# Patient Record
Sex: FEMALE | Race: ASIAN | NOT HISPANIC OR LATINO | Employment: STUDENT | URBAN - METROPOLITAN AREA
[De-identification: names, ages, dates, MRNs, and addresses within clinical notes are randomized per-mention and may not be internally consistent; named-entity substitution may affect disease eponyms.]

---

## 2020-03-02 ENCOUNTER — HOSPITAL ENCOUNTER (EMERGENCY)
Facility: MEDICAL CENTER | Age: 30
End: 2020-03-03
Attending: EMERGENCY MEDICINE
Payer: COMMERCIAL

## 2020-03-02 ENCOUNTER — APPOINTMENT (OUTPATIENT)
Dept: RADIOLOGY | Facility: MEDICAL CENTER | Age: 30
End: 2020-03-02
Attending: EMERGENCY MEDICINE
Payer: COMMERCIAL

## 2020-03-02 DIAGNOSIS — O20.0 THREATENED MISCARRIAGE: ICD-10-CM

## 2020-03-02 LAB
BASOPHILS # BLD AUTO: 0.5 % (ref 0–1.8)
BASOPHILS # BLD: 0.05 K/UL (ref 0–0.12)
EOSINOPHIL # BLD AUTO: 0.21 K/UL (ref 0–0.51)
EOSINOPHIL NFR BLD: 2.1 % (ref 0–6.9)
ERYTHROCYTE [DISTWIDTH] IN BLOOD BY AUTOMATED COUNT: 37.2 FL (ref 35.9–50)
HCT VFR BLD AUTO: 42.3 % (ref 37–47)
HGB BLD-MCNC: 14 G/DL (ref 12–16)
IMM GRANULOCYTES # BLD AUTO: 0.02 K/UL (ref 0–0.11)
IMM GRANULOCYTES NFR BLD AUTO: 0.2 % (ref 0–0.9)
LYMPHOCYTES # BLD AUTO: 3.35 K/UL (ref 1–4.8)
LYMPHOCYTES NFR BLD: 33.8 % (ref 22–41)
MCH RBC QN AUTO: 25.8 PG (ref 27–33)
MCHC RBC AUTO-ENTMCNC: 33.1 G/DL (ref 33.6–35)
MCV RBC AUTO: 78 FL (ref 81.4–97.8)
MONOCYTES # BLD AUTO: 0.48 K/UL (ref 0–0.85)
MONOCYTES NFR BLD AUTO: 4.8 % (ref 0–13.4)
NEUTROPHILS # BLD AUTO: 5.79 K/UL (ref 2–7.15)
NEUTROPHILS NFR BLD: 58.6 % (ref 44–72)
NRBC # BLD AUTO: 0 K/UL
NRBC BLD-RTO: 0 /100 WBC
NUMBER OF RH DOSES IND 8505RD: NORMAL
PLATELET # BLD AUTO: 211 K/UL (ref 164–446)
PMV BLD AUTO: 10.9 FL (ref 9–12.9)
RBC # BLD AUTO: 5.42 M/UL (ref 4.2–5.4)
RH BLD: NORMAL
WBC # BLD AUTO: 9.9 K/UL (ref 4.8–10.8)

## 2020-03-02 PROCEDURE — 85025 COMPLETE CBC W/AUTO DIFF WBC: CPT

## 2020-03-02 PROCEDURE — 86901 BLOOD TYPING SEROLOGIC RH(D): CPT

## 2020-03-02 PROCEDURE — 81002 URINALYSIS NONAUTO W/O SCOPE: CPT

## 2020-03-02 PROCEDURE — 84702 CHORIONIC GONADOTROPIN TEST: CPT

## 2020-03-02 PROCEDURE — 76801 OB US < 14 WKS SINGLE FETUS: CPT

## 2020-03-02 PROCEDURE — 99284 EMERGENCY DEPT VISIT MOD MDM: CPT

## 2020-03-03 VITALS
HEART RATE: 96 BPM | BODY MASS INDEX: 20.32 KG/M2 | WEIGHT: 119.05 LBS | OXYGEN SATURATION: 95 % | TEMPERATURE: 97 F | HEIGHT: 64 IN | SYSTOLIC BLOOD PRESSURE: 105 MMHG | RESPIRATION RATE: 18 BRPM | DIASTOLIC BLOOD PRESSURE: 78 MMHG

## 2020-03-03 LAB — B-HCG SERPL-ACNC: 5133.5 MIU/ML (ref 0–5)

## 2020-03-03 NOTE — ED PROVIDER NOTES
ED Provider Note    CHIEF COMPLAINT  Chief Complaint   Patient presents with   • Abdominal Pain     Positive home pregnancy test, , approximately 8 weeks gestation/ no OB-GYN appointment yet   • Vaginal Bleeding   • Fatigue       HPI  Марина Zhang is a 29 y.o. female who presents with abdominal pain.  Patient states she is approximately 8 weeks pregnant and she developed some vaginal bleeding today with suprapubic abdominal pain.  She states the pain is moderate in intensity with no known exacerbating or relieving factors.  She has not had any vaginal discharge.  She denies difficulties with urination.  She has not had any associated fevers.    REVIEW OF SYSTEMS  See HPI for further details. All other systems are negative.     PAST MEDICAL HISTORY  No past medical history on file.    FAMILY HISTORY  @[email or web address]@    SOCIAL HISTORY  Social History     Socioeconomic History   • Marital status: Not on file     Spouse name: Not on file   • Number of children: Not on file   • Years of education: Not on file   • Highest education level: Not on file   Occupational History   • Not on file   Social Needs   • Financial resource strain: Not on file   • Food insecurity     Worry: Not on file     Inability: Not on file   • Transportation needs     Medical: Not on file     Non-medical: Not on file   Tobacco Use   • Smoking status: Not on file   Substance and Sexual Activity   • Alcohol use: Not on file   • Drug use: Not on file   • Sexual activity: Not on file   Lifestyle   • Physical activity     Days per week: Not on file     Minutes per session: Not on file   • Stress: Not on file   Relationships   • Social connections     Talks on phone: Not on file     Gets together: Not on file     Attends Confucianism service: Not on file     Active member of club or organization: Not on file     Attends meetings of clubs or organizations: Not on file     Relationship status: Not on file   • Intimate partner violence     Fear of current or ex  "partner: Not on file     Emotionally abused: Not on file     Physically abused: Not on file     Forced sexual activity: Not on file   Other Topics Concern   • Not on file   Social History Narrative   • Not on file       SURGICAL HISTORY  No past surgical history on file.    CURRENT MEDICATIONS  Home Medications     Reviewed by Mateo Solis R.N. (Registered Nurse) on 03/02/20 at 2142  Med List Status: Partial   Medication Last Dose Status        Patient William Taking any Medications                       ALLERGIES  No Known Allergies    PHYSICAL EXAM  VITAL SIGNS: /78   Pulse 86   Temp 36.1 °C (97 °F)   Resp 18   Ht 1.626 m (5' 4\")   Wt 54 kg (119 lb 0.8 oz)   SpO2 99%   BMI 20.43 kg/m²       Constitutional: Mild acute distress, Non-toxic appearance.   HENT: Normocephalic, Atraumatic, Bilateral external ears normal, Oropharynx moist, No oral exudates, Nose normal.   Eyes: PERRLA, EOMI, Conjunctiva normal, No discharge.   Neck: Normal range of motion, No tenderness, Supple, No stridor.   Lymphatic: No lymphadenopathy noted.   Cardiovascular: Normal heart rate, Normal rhythm, No murmurs, No rubs, No gallops.   Thorax & Lungs: Normal breath sounds, No respiratory distress, No wheezing, No chest tenderness.   Abdomen: Suprapubic tenderness to deep palpation, no rebound, no guarding, normal bowel sounds  Skin: Warm, Dry, No erythema, No rash.   Back: No tenderness, No CVA tenderness.   Genitalia: External genitalia appear normal, cervix is closed, mild active bleeding  Extremities: Intact distal pulses, No edema, No tenderness, No cyanosis, No clubbing.   Neurologic: Alert & oriented x 3, Normal motor function, Normal sensory function, No focal deficits noted.   Psychiatric: Affect normal, Judgment normal, Mood normal.     RADIOLOGY/PROCEDURES  US-OB 1ST TRIMESTER WITH TRANSVAGINAL (COMBO)   Final Result      1.  Probable early intrauterine gestational sac but with no evidence of fetal pole or yolk sac. "   2.  Simple appearing cyst in the right ovary.            COURSE & MEDICAL DECISION MAKING  Pertinent Labs & Imaging studies reviewed. (See chart for details)  This is a 29-year-old female who presents the emergency department with abdominal pain and vaginal bleeding.  The patient's quantitative beta-hCG is greater than 5000 and the ultrasound only shows a gestational sac with no fetal pole.  This is concerning for inevitable miscarriage.  Otherwise there is no evidence of an ectopic pregnancy and this cannot be completely excluded.  The patient is hemodynamically stable and she is not anemic.  The patient be discharged home with clear instructions to follow-up in 48 hours either with her gynecologist this she has not yet established care with or here in the emergency department for repeat exam.  The patient's urinalysis is contaminated and she does not have any urinary symptoms to support a urinary tract infection.    FINAL IMPRESSION  1.  Threatened miscarriage    Disposition  The patient will be discharged in stable condition         Electronically signed by: Cameron Guo M.D., 3/2/2020 10:45 PM

## 2020-03-03 NOTE — ED NOTES
Pt introduced to staff, plan of care made clear to pt, no further concerns at present time. Pt resting comfortably on stretcher, pt bed height at lowest position. Pt call bell in reach. Pt has no unmet needs at present time. Pt changed into hospital gown and awaiting to be seen by ERP.

## 2020-03-03 NOTE — ED NOTES
Pt given d/c instructions. Pt able to illustrate understanding as evidence by verbal feed-back of information given. Pt is stable for d/c at present time. Pt has no further concerns at present time. Pt able to ambulate.

## 2020-03-03 NOTE — ED TRIAGE NOTES
Chief Complaint   Patient presents with   • Abdominal Pain     Positive home pregnancy test, , approximately 8 weeks gestation/ no OB-GYN appointment yet   • Vaginal Bleeding   • Fatigue

## 2020-03-04 ENCOUNTER — HOSPITAL ENCOUNTER (EMERGENCY)
Facility: MEDICAL CENTER | Age: 30
End: 2020-03-04
Attending: EMERGENCY MEDICINE
Payer: COMMERCIAL

## 2020-03-04 VITALS
WEIGHT: 125 LBS | HEIGHT: 64 IN | OXYGEN SATURATION: 97 % | SYSTOLIC BLOOD PRESSURE: 90 MMHG | HEART RATE: 60 BPM | BODY MASS INDEX: 21.34 KG/M2 | DIASTOLIC BLOOD PRESSURE: 58 MMHG | RESPIRATION RATE: 15 BRPM | TEMPERATURE: 98 F

## 2020-03-04 DIAGNOSIS — N93.9 VAGINAL BLEEDING: ICD-10-CM

## 2020-03-04 DIAGNOSIS — R10.30 LOWER ABDOMINAL PAIN: ICD-10-CM

## 2020-03-04 DIAGNOSIS — O03.9 MISCARRIAGE: ICD-10-CM

## 2020-03-04 LAB
ALBUMIN SERPL BCP-MCNC: 3.8 G/DL (ref 3.2–4.9)
ALBUMIN/GLOB SERPL: 1.1 G/DL
ALP SERPL-CCNC: 72 U/L (ref 30–99)
ALT SERPL-CCNC: 8 U/L (ref 2–50)
ANION GAP SERPL CALC-SCNC: 9 MMOL/L (ref 0–11.9)
AST SERPL-CCNC: 11 U/L (ref 12–45)
BASOPHILS # BLD AUTO: 0.5 % (ref 0–1.8)
BASOPHILS # BLD: 0.06 K/UL (ref 0–0.12)
BILIRUB SERPL-MCNC: 0.5 MG/DL (ref 0.1–1.5)
BUN SERPL-MCNC: 11 MG/DL (ref 8–22)
CALCIUM SERPL-MCNC: 8.8 MG/DL (ref 8.5–10.5)
CHLORIDE SERPL-SCNC: 104 MMOL/L (ref 96–112)
CO2 SERPL-SCNC: 24 MMOL/L (ref 20–33)
CREAT SERPL-MCNC: 0.77 MG/DL (ref 0.5–1.4)
EOSINOPHIL # BLD AUTO: 0.21 K/UL (ref 0–0.51)
EOSINOPHIL NFR BLD: 1.7 % (ref 0–6.9)
ERYTHROCYTE [DISTWIDTH] IN BLOOD BY AUTOMATED COUNT: 37.2 FL (ref 35.9–50)
GLOBULIN SER CALC-MCNC: 3.4 G/DL (ref 1.9–3.5)
GLUCOSE SERPL-MCNC: 118 MG/DL (ref 65–99)
HCT VFR BLD AUTO: 43.8 % (ref 37–47)
HGB BLD-MCNC: 14.2 G/DL (ref 12–16)
IMM GRANULOCYTES # BLD AUTO: 0.05 K/UL (ref 0–0.11)
IMM GRANULOCYTES NFR BLD AUTO: 0.4 % (ref 0–0.9)
LYMPHOCYTES # BLD AUTO: 3.92 K/UL (ref 1–4.8)
LYMPHOCYTES NFR BLD: 31.6 % (ref 22–41)
MCH RBC QN AUTO: 25.4 PG (ref 27–33)
MCHC RBC AUTO-ENTMCNC: 32.4 G/DL (ref 33.6–35)
MCV RBC AUTO: 78.5 FL (ref 81.4–97.8)
MONOCYTES # BLD AUTO: 0.71 K/UL (ref 0–0.85)
MONOCYTES NFR BLD AUTO: 5.7 % (ref 0–13.4)
NEUTROPHILS # BLD AUTO: 7.47 K/UL (ref 2–7.15)
NEUTROPHILS NFR BLD: 60.1 % (ref 44–72)
NRBC # BLD AUTO: 0 K/UL
NRBC BLD-RTO: 0 /100 WBC
PATHOLOGY CONSULT NOTE: NORMAL
PLATELET # BLD AUTO: 208 K/UL (ref 164–446)
PMV BLD AUTO: 10.5 FL (ref 9–12.9)
POTASSIUM SERPL-SCNC: 3.7 MMOL/L (ref 3.6–5.5)
PROT SERPL-MCNC: 7.2 G/DL (ref 6–8.2)
RBC # BLD AUTO: 5.58 M/UL (ref 4.2–5.4)
SODIUM SERPL-SCNC: 137 MMOL/L (ref 135–145)
WBC # BLD AUTO: 12.4 K/UL (ref 4.8–10.8)

## 2020-03-04 PROCEDURE — 99285 EMERGENCY DEPT VISIT HI MDM: CPT

## 2020-03-04 PROCEDURE — 96375 TX/PRO/DX INJ NEW DRUG ADDON: CPT

## 2020-03-04 PROCEDURE — 85025 COMPLETE CBC W/AUTO DIFF WBC: CPT

## 2020-03-04 PROCEDURE — 88305 TISSUE EXAM BY PATHOLOGIST: CPT

## 2020-03-04 PROCEDURE — 80053 COMPREHEN METABOLIC PANEL: CPT

## 2020-03-04 PROCEDURE — 96374 THER/PROPH/DIAG INJ IV PUSH: CPT

## 2020-03-04 PROCEDURE — 700111 HCHG RX REV CODE 636 W/ 250 OVERRIDE (IP): Performed by: EMERGENCY MEDICINE

## 2020-03-04 RX ORDER — ONDANSETRON 4 MG/1
4 TABLET, ORALLY DISINTEGRATING ORAL EVERY 6 HOURS PRN
Qty: 5 TAB | Refills: 0 | Status: ON HOLD | OUTPATIENT
Start: 2020-03-04 | End: 2021-01-21

## 2020-03-04 RX ORDER — ONDANSETRON 2 MG/ML
4 INJECTION INTRAMUSCULAR; INTRAVENOUS ONCE
Status: COMPLETED | OUTPATIENT
Start: 2020-03-04 | End: 2020-03-04

## 2020-03-04 RX ORDER — HYDROCODONE BITARTRATE AND ACETAMINOPHEN 5; 325 MG/1; MG/1
1 TABLET ORAL EVERY 4 HOURS PRN
Qty: 10 TAB | Refills: 0 | Status: SHIPPED | OUTPATIENT
Start: 2020-03-04 | End: 2020-03-07

## 2020-03-04 RX ORDER — MORPHINE SULFATE 4 MG/ML
4 INJECTION, SOLUTION INTRAMUSCULAR; INTRAVENOUS ONCE
Status: COMPLETED | OUTPATIENT
Start: 2020-03-04 | End: 2020-03-04

## 2020-03-04 RX ADMIN — ONDANSETRON 4 MG: 2 INJECTION INTRAMUSCULAR; INTRAVENOUS at 04:46

## 2020-03-04 RX ADMIN — MORPHINE SULFATE 4 MG: 4 INJECTION INTRAVENOUS at 04:46

## 2020-03-04 SDOH — HEALTH STABILITY: MENTAL HEALTH: HOW OFTEN DO YOU HAVE A DRINK CONTAINING ALCOHOL?: NEVER

## 2020-03-04 NOTE — ED PROVIDER NOTES
"ED Provider Note    CHIEF COMPLAINT  Chief Complaint   Patient presents with   • Miscarriage     Diagnosed with miscarriage yesterday. Pt reports vaginal bleeding and cramping. Approx 8 weeks pregnant.         HPI    Primary care provider: No primary care provider on file.   History obtained from: Patient and   History limited by: None     Марина Zhang is a 29 y.o. female who presents to the ED with  complaining of worsening lower abdominal pain tonight waking her from sleep shortly prior to arrival.  Patient states that she was seen in this ED yesterday and diagnosed with a miscarriage.  She has had continued vaginal spotting and bleeding but the pain became worse tonight which prompted her to come to the ED.  Patient is unsure of her blood type but record review shows that she is Rh+.  She reports lower back pain as well.  She otherwise denies pain anywhere else.  She denies any known fever.  She denies shortness of breath/difficulty breathing.  She has had some nausea and vomited once.  She reports normal bowel movement and urination.  She is not on any blood thinners.    REVIEW OF SYSTEMS  Please see HPI for pertinent positives/negatives.  All other systems reviewed and are negative.     PAST MEDICAL HISTORY  No past medical history on file.     SURGICAL HISTORY  History reviewed. No pertinent surgical history.     SOCIAL HISTORY  Social History     Tobacco Use   • Smoking status: Never Smoker   • Smokeless tobacco: Never Used   Substance and Sexual Activity   • Alcohol use: Never     Frequency: Never   • Drug use: Never   • Sexual activity: Not on file        FAMILY HISTORY  History reviewed. No pertinent family history.     CURRENT MEDICATIONS  Home Medications    **Home medications have not yet been reviewed for this encounter**          ALLERGIES  No Known Allergies     PHYSICAL EXAM  VITAL SIGNS: BP (!) 90/58   Pulse 60   Temp 36.7 °C (98 °F) (Temporal)   Resp 15   Ht 1.626 m (5' 4\")   Wt " 56.7 kg (125 lb)   SpO2 97%   BMI 21.46 kg/m²  @RUPERT[[id number]::@     Pulse ox interpretation: 100% I interpret this pulse ox as normal     Constitutional: Well developed, well nourished, alert in mild discomfort, nontoxic appearance    HENT: No external signs of trauma, normocephalic, oropharynx moist and clear, nose normal    Eyes: PERRL, conjunctiva without erythema, no discharge, no icterus    Neck: Soft and supple, trachea midline, no stridor, no tenderness, no LAD, no JVD, good ROM    Cardiovascular: Regular rate and rhythm, no murmurs/rubs/gallops, strong distal pulses and good perfusion    Thorax & Lungs: No respiratory distress, CTAB   Abdomen: Soft, mild tenderness across lower abdomen, nondistended, no guarding, no rebound, normal BS   : Female chaperon present for exam.  NEFG, vaginal canal with blood in appearing tissue which was removed using ring forceps and sent to pathology, cervix closed, no CMT, no uterine TTP, no AT or palpable mass     Back: No CVAT    Extremities: No cyanosis, no edema, no gross deformity, good ROM, intact distal pulses with brisk cap refill    Skin: Warm, dry, no pallor/cyanosis, no rash noted    Lymphatic: No lymphadenopathy noted    Neuro: A/O times 3, no focal deficits noted    Psychiatric: Cooperative, slightly anxious      DIAGNOSTIC STUDIES / PROCEDURES        LABS  All labs reviewed by me.     Results for orders placed or performed during the hospital encounter of 03/04/20   CBC WITH DIFFERENTIAL   Result Value Ref Range    WBC 12.4 (H) 4.8 - 10.8 K/uL    RBC 5.58 (H) 4.20 - 5.40 M/uL    Hemoglobin 14.2 12.0 - 16.0 g/dL    Hematocrit 43.8 37.0 - 47.0 %    MCV 78.5 (L) 81.4 - 97.8 fL    MCH 25.4 (L) 27.0 - 33.0 pg    MCHC 32.4 (L) 33.6 - 35.0 g/dL    RDW 37.2 35.9 - 50.0 fL    Platelet Count 208 164 - 446 K/uL    MPV 10.5 9.0 - 12.9 fL    Neutrophils-Polys 60.10 44.00 - 72.00 %    Lymphocytes 31.60 22.00 - 41.00 %    Monocytes 5.70 0.00 - 13.40 %    Eosinophils 1.70 0.00  - 6.90 %    Basophils 0.50 0.00 - 1.80 %    Immature Granulocytes 0.40 0.00 - 0.90 %    Nucleated RBC 0.00 /100 WBC    Neutrophils (Absolute) 7.47 (H) 2.00 - 7.15 K/uL    Lymphs (Absolute) 3.92 1.00 - 4.80 K/uL    Monos (Absolute) 0.71 0.00 - 0.85 K/uL    Eos (Absolute) 0.21 0.00 - 0.51 K/uL    Baso (Absolute) 0.06 0.00 - 0.12 K/uL    Immature Granulocytes (abs) 0.05 0.00 - 0.11 K/uL    NRBC (Absolute) 0.00 K/uL   COMP METABOLIC PANEL   Result Value Ref Range    Sodium 137 135 - 145 mmol/L    Potassium 3.7 3.6 - 5.5 mmol/L    Chloride 104 96 - 112 mmol/L    Co2 24 20 - 33 mmol/L    Anion Gap 9.0 0.0 - 11.9    Glucose 118 (H) 65 - 99 mg/dL    Bun 11 8 - 22 mg/dL    Creatinine 0.77 0.50 - 1.40 mg/dL    Calcium 8.8 8.5 - 10.5 mg/dL    AST(SGOT) 11 (L) 12 - 45 U/L    ALT(SGPT) 8 2 - 50 U/L    Alkaline Phosphatase 72 30 - 99 U/L    Total Bilirubin 0.5 0.1 - 1.5 mg/dL    Albumin 3.8 3.2 - 4.9 g/dL    Total Protein 7.2 6.0 - 8.2 g/dL    Globulin 3.4 1.9 - 3.5 g/dL    A-G Ratio 1.1 g/dL   ESTIMATED GFR   Result Value Ref Range    GFR If African American >60 >60 mL/min/1.73 m 2    GFR If Non African American >60 >60 mL/min/1.73 m 2        RADIOLOGY  The radiologist's interpretation of all radiological studies have been reviewed by me.     No orders to display          COURSE & MEDICAL DECISION MAKING  Nursing notes, VS, PMSFHx reviewed in chart.     Review of past medical records shows the patient was last seen in this ED March 2, 2020 for abdominal pain, vaginal bleeding and fatigue.  Her quant hCG was 5133 and she was Rh+.  Ultrasound with the following findings:    An approximate 9 mm cystic structure is seen within the endometrial cavity, possibly an early gestational sac, but with no evidence of yolk sac or fetal pole. By sac diameter, gestational age is estimated at five weeks four days.  The ovaries are within normal limits in appearance bilaterally, except to note an approximate 1.4 cm diameter simple appearing right  ovarian cyst. No maternal adnexal mass is identified.      Differential diagnoses considered include but are not limited to: Pregnancy, Ab/miscarriage, fetal demise, anemia      History and physical exam as above patient was treated with Zofran and morphine and reports feeling much better.  She tolerated oral fluids without difficulty.  Labs are fairly unremarkable except for mild leukocytosis likely stress reaction.  Vaginal exam showed blood and possible tissue which was sent for pathology.  Cervical os was closed with trace bleeding.  Findings discussed with the patient and .  She appears much calmer and is noted to be in no acute distress and nontoxic in appearance.  No signs of acute abdomen on recheck to suggest a surgical emergency.  Patient is not on any blood thinners and without risk factors for severe hemorrhage.  Discussed with them expected course for miscarriage as well as I worrisome signs and symptoms and return to ED precautions and need for follow-up with her OB/GYN.  She will be prescribed Zofran and Norco to use as needed.  They verbalized understanding and agreed with plan of care with no further questions or concerns.       In prescribing controlled substances to this patient, I certify that I have obtained and reviewed the medical history of Марина Zhang. I have also made a good lior effort to obtain applicable records from other providers who have treated the patient and records did not demonstrate any increased risk of substance abuse that would prevent me from prescribing controlled substances.     I have conducted a physical exam and documented it. I have reviewed patient's prescription history as maintained by the [place] Prescription Monitoring Program.     I have assessed the patient’s risk for abuse, dependency, and addiction using the validated Opioid Risk Tool available at https://www.mdcalc.com/guryqx-lcst-qdvs-ort-narcotic-abuse.     Given the above, I believe the benefits  of controlled substance therapy outweigh the risks. The reasons for prescribing controlled substances include non-narcotic, oral analgesic alternatives have been inadequate for pain control. Accordingly, I have discussed the risk and benefits, treatment plan, and alternative therapies with the patient.       The patient is referred to a primary physician for blood pressure management, diabetic screening, and for all other preventative health concerns.       FINAL IMPRESSION  1. Lower abdominal pain Acute   2. Vaginal bleeding Acute   3. Miscarriage Acute          DISPOSITION  Patient will be discharged home in stable condition.       FOLLOW UP  [facility]  [address]  [place] [id number] [phone]  Call today      [facility], Emergency Dept  [address] [id number] [phone]    If symptoms worsen         OUTPATIENT MEDICATIONS  Discharge Medication List as of 3/4/2020  6:09 AM      START taking these medications    Details   ondansetron (ZOFRAN ODT) 4 MG TABLET DISPERSIBLE Take 1 Tab by mouth every 6 hours as needed., Disp-5 Tab, R-0, Print Rx Paper      HYDROcodone-acetaminophen (NORCO) 5-325 MG Tab per tablet Take 1 Tab by mouth every four hours as needed for up to 3 days., Disp-10 Tab, R-0, Print Rx Paper                Electronically signed by: Kehinde Barajas D.O., 3/4/2020 4:36 AM      Portions of this record were made with voice recognition software.  Despite my review, spelling/grammar/context errors may still remain.  Interpretation of this chart should be taken in this context.

## 2020-03-04 NOTE — ED NOTES
Discharge teaching and paperwork provided to patient and all questions/concerns answered. VSS, abdominal assessment stable and PIV removed. Given information regarding Norco and zofran Rx. Patient discharged to the care of her significant other and ambulated out of the ED.  The patient was instructed to not drive while taking Norco and was instructed on the danger of taking Tylenol along with Norco and to avoid taking more than 4g in a 24 hour period.

## 2020-03-04 NOTE — ED TRIAGE NOTES
"Chief Complaint   Patient presents with   • Miscarriage     Diagnosed with miscarriage yesterday. Pt reports vaginal bleeding and cramping. Approx 8 weeks pregnant.      /74   Pulse 78   Temp 36.7 °C (98 °F) (Temporal)   Resp 18   Ht 1.626 m (5' 4\")   Wt 56.7 kg (125 lb)   SpO2 100%   BMI 21.46 kg/m²     PT to ER for above complaint. Educated on triage process. Charge notified.  "

## 2020-03-04 NOTE — ED NOTES
Patient back to Red 1 via wheelchair, bent over holding her abdomen. ER tech at bedside assisting patient into gown.

## 2020-03-16 LAB
APPEARANCE UR: CLEAR
COLOR UR AUTO: ABNORMAL
GLUCOSE UR QL STRIP.AUTO: NEGATIVE MG/DL
KETONES UR QL STRIP.AUTO: NEGATIVE MG/DL
LEUKOCYTE ESTERASE UR QL STRIP.AUTO: NEGATIVE
NITRITE UR QL STRIP.AUTO: NEGATIVE
PH UR STRIP.AUTO: 7 [PH] (ref 5–8)
PROT UR QL STRIP: 30 MG/DL
RBC UR QL AUTO: ABNORMAL
SP GR UR: 1.02 (ref 1–1.03)

## 2020-06-10 LAB
C TRACH DNA GENITAL QL NAA+PROBE: NEGATIVE
N GONORRHOEA DNA GENITAL QL NAA+PROBE: NEGATIVE

## 2020-06-29 LAB
ABO GROUP BLD: NORMAL
BLD GP AB SCN SERPL QL: NEGATIVE
HBV SURFACE AG SERPL QL IA: NEGATIVE
HCT VFR BLD AUTO: 41 %
HGB BLD-MCNC: 13.4 G/DL
HIV 1+2 AB+HIV1 P24 AG SERPL QL IA: NORMAL
RH BLD: POSITIVE
RUBV IGG SERPL IA-ACNC: 7.35
TREPONEMA PALLIDUM IGG+IGM AB [PRESENCE] IN SERUM OR PLASMA BY IMMUNOASSAY: NORMAL

## 2020-11-06 LAB
GLUCOSE 1H P CHAL SERPL-MCNC: 254 MG/DL
HCT VFR BLD AUTO: 34.7 %
HGB BLD-MCNC: 11.6 G/DL

## 2020-11-11 ENCOUNTER — NON-PROVIDER VISIT (OUTPATIENT)
Dept: HEALTH INFORMATION MANAGEMENT | Facility: MEDICAL CENTER | Age: 30
End: 2020-11-11
Payer: OTHER MISCELLANEOUS

## 2020-11-11 VITALS — BODY MASS INDEX: 22.94 KG/M2 | WEIGHT: 134.4 LBS | HEIGHT: 64 IN

## 2020-11-11 DIAGNOSIS — O24.419 GESTATIONAL DIABETES MELLITUS (GDM) IN THIRD TRIMESTER, GESTATIONAL DIABETES METHOD OF CONTROL UNSPECIFIED: ICD-10-CM

## 2020-11-11 PROCEDURE — G0109 DIAB MANAGE TRN IND/GROUP: HCPCS | Performed by: INTERNAL MEDICINE

## 2020-11-11 ASSESSMENT — FIBROSIS 4 INDEX: FIB4 SCORE: 0.52

## 2020-11-11 NOTE — LETTER
November 11, 2020                 Re: Марина Zhang     1990         [id number]     New Frost M.D.  [address] [address]      Dear :New Frost M.D.    On 11/11/2020, your patient Марина Zhang, received 1 hours of nutrition training from the Diabetes Center at [facility] for management of her gestational diabetes.  Her EDC is Estimated Date of Delivery: None noted..  We taught the following subjects:    Patient was provided with a 1800 calorie meal plan with 3 meals and 3 snacks.  Meal plan contains 180 grams of carbohydrates per day.   Importance of meal planning in diabetes management during pregnancy  Importance of consistent timing of meals and snacks and agreed upon times  Avoidance of simple carbohydrates  Metabolism of food components relating to pregnancy  Identification of foods in food groups  Patient demonstrates adequate ability to utilize meal planning manual for reference  Plan 3 meals and 3 snacks with 90% accuracy  Review basic principles of eating out  Reviewed precautions with artificial sweeteners    Comments: Марина agreed to follow the meal plan and to eat at the times agreed upon.  She will check blood glucose 4 times a day and record the values in her log book.      Марина Zhang was encouraged to discuss this further with you.    Hopefully this will help in your management of her care.  If we can be of further assistance, please feel free to call.    Thank you for the referral.    Sincerely,    GDM CLASS  Allie Arredondo RD,LD  [facility]  [phone]

## 2020-11-11 NOTE — LETTER
November 11, 2020                   Re: Марина Zhang     1990         [id number]       New Frost M.D.  [address] [address]  [place] [address]      Dear :New Frost M.D.    On 11/11/2020, your patient Марина Zhang, received 1 hour of nurse training from the Diabetes Center at [facility] for management of her gestational diabetes.  Her EDC is Estimated Date of Delivery: 2/1/21.  We taught the following subjects:    Introduction to gestational diabetes, benefits and responsibilities of patient, physiology of diabetes and the diease process, benefits of blood glucose monitoring and record keeping, medication action and possible side effects, hypoglycemia, sick day management, exercise, stress reduction and travel with diabetes.       Nurse assessment / Education:    Comments:        Weight:Weight: 61 kg (134 lb 6.4 oz)         Complaints:no      Pathophysiology of diabetes in pregnancy    Discuss  potential maternal and fetal complications in pregnancy with diabetes.     Importance of blood glucose monitoring   Proper testing technique using a Accucheck Guide meter.    At 3pm, the meter read 96, which was 1 hour after eating.  Testing: fasting and one hour after meals,  expected ranges and rationale for strict control.     Ketone test today:no       Recognition and treatment of hypoglycemia.     Should patient require insulin later in pregnancy, she would need further education.   Insulin taught: Yes          Insulin type and dose: Reviewed with normal saline  · Demonstrate insulin administration SQ, since insulin administration new to patient  · Demonstrate accurately drawing up insulin dose  · Discussed onset, peak and duration of insulin prescribed  · Reviewed understanding of site rotation, equipment storage and disposal    Reviewed fetal kick counts and other tests to determine fetal well-being  Discuss benefits and risks of exercise in pregnancy  Discuss when to call  Doctor  Discuss sick day care  Importance of wearing diabetes identification      Patient/caregiver appeared to understand the content as demonstrated by appropriate questions.     Марина Zhang was encouraged to discuss this further with you.    Hopefully this will help in your management of her care.  If we can be of further assistance, please feel free to call.    Thank you for the referral.    Sincerely,      Jovana Phillips RN CDE  GDM CLASS  Certified Diabetes Educator

## 2020-11-12 ENCOUNTER — TELEPHONE (OUTPATIENT)
Dept: HEALTH INFORMATION MANAGEMENT | Facility: MEDICAL CENTER | Age: 30
End: 2020-11-12

## 2020-11-12 NOTE — PROGRESS NOTES
The pt received an 1800 calorie meal plan (based on 30 kcal/kg of current weight) consisting of 3 meals and 3 snacks (see media for copy of meal plan).   Pt's prepregnancy weight was: 119lb. She has gained 15lb so far in this pregnancy.   Recommended meal times:   B: 8  S: 10-11  L: 1-2  S: 3:30-4:30   D: 7-8  S: 10   Pt was educated on carbohydrate containing foods vs non carbohydrate containing foods, the importance of small frequent meals, limiting carbohydrate to 1 serving in the morning, no fruit before noon/12pm, and avoiding all concentrated sweets for the remainder of her pregnancy. Explained the importance of not going >4hrs btwn eating during the day and no longer than 10hours overnight. Patient agrees to follow the meal plan and guidelines provided.

## 2020-11-12 NOTE — PROGRESS NOTES
Марина came to the Gestational Diabetes program.  She states her grandfather has Type 2 Diabetes.  She denies any other problems with this pregnancy.  She has been using the Accu-Chek Guide meter.  She has had fasting blood sugars 100-120 and she states she has not been able to eat carbohydrates without her blood sugars going high after meals.  She understands that we do not want her starving to keep her blood sugars down.  I did show how to draw up insulin using a normal saline vial and syringe..She was able to do a return demonstration without difficulty. She will keep walking and stay well hydrated with water. Her meal plan is scanned into Media and her Doctor Letters with what was taught can be found under the Letters tab.

## 2020-12-24 ENCOUNTER — HOSPITAL ENCOUNTER (OUTPATIENT)
Facility: MEDICAL CENTER | Age: 30
End: 2020-12-24
Attending: OBSTETRICS & GYNECOLOGY | Admitting: OBSTETRICS & GYNECOLOGY
Payer: COMMERCIAL

## 2020-12-24 ENCOUNTER — APPOINTMENT (OUTPATIENT)
Dept: OBGYN | Facility: MEDICAL CENTER | Age: 30
End: 2020-12-24
Attending: OBSTETRICS & GYNECOLOGY
Payer: COMMERCIAL

## 2020-12-24 VITALS
SYSTOLIC BLOOD PRESSURE: 110 MMHG | HEIGHT: 64 IN | DIASTOLIC BLOOD PRESSURE: 76 MMHG | OXYGEN SATURATION: 93 % | BODY MASS INDEX: 24.07 KG/M2 | WEIGHT: 141 LBS | TEMPERATURE: 97.1 F | HEART RATE: 95 BPM | RESPIRATION RATE: 16 BRPM

## 2020-12-24 PROCEDURE — 59025 FETAL NON-STRESS TEST: CPT

## 2020-12-24 ASSESSMENT — PAIN SCALES - GENERAL: PAINLEVEL: 0 - NO PAIN

## 2020-12-24 ASSESSMENT — FIBROSIS 4 INDEX: FIB4 SCORE: 0.52

## 2020-12-24 NOTE — PROGRESS NOTES
NST Review    Марина Zhang is a 29 yo  who presented to L&D at 34w3d for and NST for A2DM. Vital signs were within normal limits. NST was performed and reviewed by myself with baseline of 140's. Moderate variability was present. Accelerations were present. Decelerations were absent. Tocometer was quiescent. She was stable for discharge home and follow up in the clinic as planned.     Geovanna Cisse M.D.

## 2020-12-24 NOTE — PROGRESS NOTES
Pt presents to L&D for NST. Pt ambulated to [facility] 3 for assessment.     1015 TOCO and EFM applied, VSS. Pt reports +FM, denies LOF or VB. Pt states she has an occasional UC but nothing consistent.     1036 Dr. Cisse updated on pt status, tracing reviewed, okay to discharge home.     1040 RN at bedside,  labor precautions given and all questions answered. Pt verbalized understanding on when to return to L&D for further evaluation.     1045 Pt discharged home in stable condition.

## 2021-01-09 LAB
GP B STREP DNA SPEC QL NAA+PROBE: NEGATIVE
STREP GP B DNA PCR: NEGATIVE

## 2021-01-15 ENCOUNTER — APPOINTMENT (OUTPATIENT)
Dept: OBGYN | Facility: MEDICAL CENTER | Age: 31
End: 2021-01-15
Attending: OBSTETRICS & GYNECOLOGY
Payer: COMMERCIAL

## 2021-01-15 LAB
SARS-COV-2 RNA RESP QL NAA+PROBE: NOTDETECTED
SPECIMEN SOURCE: NORMAL

## 2021-01-15 PROCEDURE — U0005 INFEC AGEN DETEC AMPLI PROBE: HCPCS

## 2021-01-15 PROCEDURE — U0003 INFECTIOUS AGENT DETECTION BY NUCLEIC ACID (DNA OR RNA); SEVERE ACUTE RESPIRATORY SYNDROME CORONAVIRUS 2 (SARS-COV-2) (CORONAVIRUS DISEASE [COVID-19]), AMPLIFIED PROBE TECHNIQUE, MAKING USE OF HIGH THROUGHPUT TECHNOLOGIES AS DESCRIBED BY CMS-2020-01-R: HCPCS

## 2021-01-15 NOTE — PROGRESS NOTES
Pt here for covid screen; test collected; tolerated well, given self isolating discharge instructions, verbalizes understanding. Discharged to home.

## 2021-01-18 ENCOUNTER — APPOINTMENT (OUTPATIENT)
Dept: OBGYN | Facility: MEDICAL CENTER | Age: 31
End: 2021-01-18
Attending: OBSTETRICS & GYNECOLOGY
Payer: COMMERCIAL

## 2021-01-18 ENCOUNTER — HOSPITAL ENCOUNTER (INPATIENT)
Facility: MEDICAL CENTER | Age: 31
LOS: 2 days | End: 2021-01-21
Attending: OBSTETRICS & GYNECOLOGY | Admitting: OBSTETRICS & GYNECOLOGY
Payer: COMMERCIAL

## 2021-01-18 PROCEDURE — 82962 GLUCOSE BLOOD TEST: CPT

## 2021-01-18 PROCEDURE — 85025 COMPLETE CBC W/AUTO DIFF WBC: CPT

## 2021-01-18 ASSESSMENT — FIBROSIS 4 INDEX: FIB4 SCORE: 0.52

## 2021-01-19 ENCOUNTER — ANESTHESIA (OUTPATIENT)
Dept: ANESTHESIOLOGY | Facility: MEDICAL CENTER | Age: 31
End: 2021-01-19
Payer: COMMERCIAL

## 2021-01-19 ENCOUNTER — ANESTHESIA EVENT (OUTPATIENT)
Dept: ANESTHESIOLOGY | Facility: MEDICAL CENTER | Age: 31
End: 2021-01-19
Payer: COMMERCIAL

## 2021-01-19 LAB
BASOPHILS # BLD AUTO: 0.4 % (ref 0–1.8)
BASOPHILS # BLD: 0.04 K/UL (ref 0–0.12)
EOSINOPHIL # BLD AUTO: 0.63 K/UL (ref 0–0.51)
EOSINOPHIL NFR BLD: 5.7 % (ref 0–6.9)
ERYTHROCYTE [DISTWIDTH] IN BLOOD BY AUTOMATED COUNT: 39.1 FL (ref 35.9–50)
GLUCOSE BLD-MCNC: 71 MG/DL (ref 65–99)
GLUCOSE BLD-MCNC: 81 MG/DL (ref 65–99)
GLUCOSE BLD-MCNC: 82 MG/DL (ref 65–99)
GLUCOSE BLD-MCNC: 88 MG/DL (ref 65–99)
GLUCOSE BLD-MCNC: 94 MG/DL (ref 65–99)
GLUCOSE BLD-MCNC: 97 MG/DL (ref 65–99)
HCT VFR BLD AUTO: 36.5 % (ref 37–47)
HGB BLD-MCNC: 12.2 G/DL (ref 12–16)
HOLDING TUBE BB 8507: NORMAL
IMM GRANULOCYTES # BLD AUTO: 0.09 K/UL (ref 0–0.11)
IMM GRANULOCYTES NFR BLD AUTO: 0.8 % (ref 0–0.9)
LYMPHOCYTES # BLD AUTO: 2.6 K/UL (ref 1–4.8)
LYMPHOCYTES NFR BLD: 23.4 % (ref 22–41)
MCH RBC QN AUTO: 27 PG (ref 27–33)
MCHC RBC AUTO-ENTMCNC: 33.4 G/DL (ref 33.6–35)
MCV RBC AUTO: 80.8 FL (ref 81.4–97.8)
MONOCYTES # BLD AUTO: 0.79 K/UL (ref 0–0.85)
MONOCYTES NFR BLD AUTO: 7.1 % (ref 0–13.4)
NEUTROPHILS # BLD AUTO: 6.95 K/UL (ref 2–7.15)
NEUTROPHILS NFR BLD: 62.6 % (ref 44–72)
NRBC # BLD AUTO: 0 K/UL
NRBC BLD-RTO: 0 /100 WBC
PLATELET # BLD AUTO: 157 K/UL (ref 164–446)
PMV BLD AUTO: 11.7 FL (ref 9–12.9)
RBC # BLD AUTO: 4.52 M/UL (ref 4.2–5.4)
WBC # BLD AUTO: 11.1 K/UL (ref 4.8–10.8)

## 2021-01-19 PROCEDURE — 82962 GLUCOSE BLOOD TEST: CPT | Mod: 91

## 2021-01-19 PROCEDURE — 3E0P7VZ INTRODUCTION OF HORMONE INTO FEMALE REPRODUCTIVE, VIA NATURAL OR ARTIFICIAL OPENING: ICD-10-PCS | Performed by: OBSTETRICS & GYNECOLOGY

## 2021-01-19 PROCEDURE — A9270 NON-COVERED ITEM OR SERVICE: HCPCS | Performed by: OBSTETRICS & GYNECOLOGY

## 2021-01-19 PROCEDURE — 770002 HCHG ROOM/CARE - OB PRIVATE (112)

## 2021-01-19 PROCEDURE — 0HQ9XZZ REPAIR PERINEUM SKIN, EXTERNAL APPROACH: ICD-10-PCS | Performed by: OBSTETRICS & GYNECOLOGY

## 2021-01-19 PROCEDURE — 700102 HCHG RX REV CODE 250 W/ 637 OVERRIDE(OP): Performed by: OBSTETRICS & GYNECOLOGY

## 2021-01-19 PROCEDURE — 700111 HCHG RX REV CODE 636 W/ 250 OVERRIDE (IP): Performed by: OBSTETRICS & GYNECOLOGY

## 2021-01-19 PROCEDURE — 700111 HCHG RX REV CODE 636 W/ 250 OVERRIDE (IP): Performed by: ANESTHESIOLOGY

## 2021-01-19 PROCEDURE — 36415 COLL VENOUS BLD VENIPUNCTURE: CPT

## 2021-01-19 PROCEDURE — 304965 HCHG RECOVERY SERVICES

## 2021-01-19 PROCEDURE — 700111 HCHG RX REV CODE 636 W/ 250 OVERRIDE (IP)

## 2021-01-19 PROCEDURE — 59409 OBSTETRICAL CARE: CPT

## 2021-01-19 PROCEDURE — 700105 HCHG RX REV CODE 258: Performed by: OBSTETRICS & GYNECOLOGY

## 2021-01-19 PROCEDURE — 700101 HCHG RX REV CODE 250: Performed by: ANESTHESIOLOGY

## 2021-01-19 PROCEDURE — 10H07YZ INSERTION OF OTHER DEVICE INTO PRODUCTS OF CONCEPTION, VIA NATURAL OR ARTIFICIAL OPENING: ICD-10-PCS | Performed by: OBSTETRICS & GYNECOLOGY

## 2021-01-19 PROCEDURE — 3E033VJ INTRODUCTION OF OTHER HORMONE INTO PERIPHERAL VEIN, PERCUTANEOUS APPROACH: ICD-10-PCS | Performed by: OBSTETRICS & GYNECOLOGY

## 2021-01-19 PROCEDURE — 10907ZC DRAINAGE OF AMNIOTIC FLUID, THERAPEUTIC FROM PRODUCTS OF CONCEPTION, VIA NATURAL OR ARTIFICIAL OPENING: ICD-10-PCS | Performed by: OBSTETRICS & GYNECOLOGY

## 2021-01-19 PROCEDURE — 700101 HCHG RX REV CODE 250

## 2021-01-19 PROCEDURE — 303615 HCHG EPIDURAL/SPINAL ANESTHESIA FOR LABOR

## 2021-01-19 RX ORDER — DEXTROSE, SODIUM CHLORIDE, SODIUM LACTATE, POTASSIUM CHLORIDE, AND CALCIUM CHLORIDE 5; .6; .31; .03; .02 G/100ML; G/100ML; G/100ML; G/100ML; G/100ML
INJECTION, SOLUTION INTRAVENOUS CONTINUOUS
Status: DISCONTINUED | OUTPATIENT
Start: 2021-01-19 | End: 2021-01-21 | Stop reason: HOSPADM

## 2021-01-19 RX ORDER — IBUPROFEN 600 MG/1
600 TABLET ORAL EVERY 6 HOURS PRN
Status: DISCONTINUED | OUTPATIENT
Start: 2021-01-19 | End: 2021-01-21 | Stop reason: HOSPADM

## 2021-01-19 RX ORDER — ONDANSETRON 4 MG/1
4 TABLET, ORALLY DISINTEGRATING ORAL EVERY 6 HOURS PRN
Status: DISCONTINUED | OUTPATIENT
Start: 2021-01-19 | End: 2021-01-21 | Stop reason: HOSPADM

## 2021-01-19 RX ORDER — BISACODYL 10 MG
10 SUPPOSITORY, RECTAL RECTAL PRN
Status: DISCONTINUED | OUTPATIENT
Start: 2021-01-19 | End: 2021-01-21 | Stop reason: HOSPADM

## 2021-01-19 RX ORDER — ONDANSETRON 2 MG/ML
4 INJECTION INTRAMUSCULAR; INTRAVENOUS EVERY 6 HOURS PRN
Status: DISCONTINUED | OUTPATIENT
Start: 2021-01-19 | End: 2021-01-21 | Stop reason: HOSPADM

## 2021-01-19 RX ORDER — ROPIVACAINE HYDROCHLORIDE 2 MG/ML
INJECTION, SOLUTION EPIDURAL; INFILTRATION PRN
Status: DISCONTINUED | OUTPATIENT
Start: 2021-01-19 | End: 2021-01-19 | Stop reason: SURG

## 2021-01-19 RX ORDER — SODIUM CHLORIDE, SODIUM LACTATE, POTASSIUM CHLORIDE, CALCIUM CHLORIDE 600; 310; 30; 20 MG/100ML; MG/100ML; MG/100ML; MG/100ML
INJECTION, SOLUTION INTRAVENOUS CONTINUOUS
Status: ACTIVE | OUTPATIENT
Start: 2021-01-19 | End: 2021-01-19

## 2021-01-19 RX ORDER — HYDROCODONE BITARTRATE AND ACETAMINOPHEN 10; 325 MG/1; MG/1
1 TABLET ORAL EVERY 4 HOURS PRN
Status: DISCONTINUED | OUTPATIENT
Start: 2021-01-19 | End: 2021-01-21 | Stop reason: HOSPADM

## 2021-01-19 RX ORDER — SODIUM CHLORIDE, SODIUM LACTATE, POTASSIUM CHLORIDE, AND CALCIUM CHLORIDE .6; .31; .03; .02 G/100ML; G/100ML; G/100ML; G/100ML
1000 INJECTION, SOLUTION INTRAVENOUS
Status: DISCONTINUED | OUTPATIENT
Start: 2021-01-19 | End: 2021-01-19 | Stop reason: HOSPADM

## 2021-01-19 RX ORDER — VITAMIN A ACETATE, BETA CAROTENE, ASCORBIC ACID, CHOLECALCIFEROL, .ALPHA.-TOCOPHEROL ACETATE, DL-, THIAMINE MONONITRATE, RIBOFLAVIN, NIACINAMIDE, PYRIDOXINE HYDROCHLORIDE, FOLIC ACID, CYANOCOBALAMIN, CALCIUM CARBONATE, FERROUS FUMARATE, ZINC OXIDE, CUPRIC OXIDE 3080; 12; 120; 400; 1; 1.84; 3; 20; 22; 920; 25; 200; 27; 10; 2 [IU]/1; UG/1; MG/1; [IU]/1; MG/1; MG/1; MG/1; MG/1; MG/1; [IU]/1; MG/1; MG/1; MG/1; MG/1; MG/1
1 TABLET, FILM COATED ORAL EVERY MORNING
Status: DISCONTINUED | OUTPATIENT
Start: 2021-01-20 | End: 2021-01-21 | Stop reason: HOSPADM

## 2021-01-19 RX ORDER — ROPIVACAINE HYDROCHLORIDE 2 MG/ML
INJECTION, SOLUTION EPIDURAL; INFILTRATION; PERINEURAL CONTINUOUS
Status: DISCONTINUED | OUTPATIENT
Start: 2021-01-19 | End: 2021-01-21 | Stop reason: HOSPADM

## 2021-01-19 RX ORDER — LIDOCAINE HYDROCHLORIDE AND EPINEPHRINE 15; 5 MG/ML; UG/ML
INJECTION, SOLUTION EPIDURAL PRN
Status: DISCONTINUED | OUTPATIENT
Start: 2021-01-19 | End: 2021-01-19 | Stop reason: SURG

## 2021-01-19 RX ORDER — MISOPROSTOL 200 UG/1
800 TABLET ORAL
Status: DISCONTINUED | OUTPATIENT
Start: 2021-01-19 | End: 2021-01-21 | Stop reason: HOSPADM

## 2021-01-19 RX ORDER — LIDOCAINE HYDROCHLORIDE 10 MG/ML
INJECTION, SOLUTION INFILTRATION; PERINEURAL
Status: COMPLETED
Start: 2021-01-19 | End: 2021-01-19

## 2021-01-19 RX ORDER — INSULIN GLARGINE 100 [IU]/ML
36 INJECTION, SOLUTION SUBCUTANEOUS EVERY EVENING
Status: ON HOLD | COMMUNITY
End: 2021-01-21

## 2021-01-19 RX ORDER — HYDROCODONE BITARTRATE AND ACETAMINOPHEN 5; 325 MG/1; MG/1
1 TABLET ORAL EVERY 4 HOURS PRN
Status: DISCONTINUED | OUTPATIENT
Start: 2021-01-19 | End: 2021-01-21 | Stop reason: HOSPADM

## 2021-01-19 RX ORDER — ROPIVACAINE HYDROCHLORIDE 2 MG/ML
INJECTION, SOLUTION EPIDURAL; INFILTRATION; PERINEURAL
Status: COMPLETED
Start: 2021-01-19 | End: 2021-01-19

## 2021-01-19 RX ORDER — SODIUM CHLORIDE, SODIUM LACTATE, POTASSIUM CHLORIDE, AND CALCIUM CHLORIDE .6; .31; .03; .02 G/100ML; G/100ML; G/100ML; G/100ML
250 INJECTION, SOLUTION INTRAVENOUS PRN
Status: DISCONTINUED | OUTPATIENT
Start: 2021-01-19 | End: 2021-01-19 | Stop reason: HOSPADM

## 2021-01-19 RX ORDER — ACETAMINOPHEN 325 MG/1
325 TABLET ORAL EVERY 4 HOURS PRN
Status: DISCONTINUED | OUTPATIENT
Start: 2021-01-19 | End: 2021-01-21 | Stop reason: HOSPADM

## 2021-01-19 RX ORDER — DOCUSATE SODIUM 100 MG/1
100 CAPSULE, LIQUID FILLED ORAL 2 TIMES DAILY PRN
Status: DISCONTINUED | OUTPATIENT
Start: 2021-01-19 | End: 2021-01-21 | Stop reason: HOSPADM

## 2021-01-19 RX ORDER — SODIUM CHLORIDE, SODIUM LACTATE, POTASSIUM CHLORIDE, CALCIUM CHLORIDE 600; 310; 30; 20 MG/100ML; MG/100ML; MG/100ML; MG/100ML
INJECTION, SOLUTION INTRAVENOUS PRN
Status: DISCONTINUED | OUTPATIENT
Start: 2021-01-19 | End: 2021-01-21 | Stop reason: HOSPADM

## 2021-01-19 RX ORDER — BUPIVACAINE HYDROCHLORIDE 2.5 MG/ML
INJECTION, SOLUTION EPIDURAL; INFILTRATION; INTRACAUDAL
Status: ACTIVE
Start: 2021-01-19 | End: 2021-01-20

## 2021-01-19 RX ORDER — MISOPROSTOL 200 UG/1
800 TABLET ORAL
Status: DISCONTINUED | OUTPATIENT
Start: 2021-01-19 | End: 2021-01-19 | Stop reason: HOSPADM

## 2021-01-19 RX ORDER — CITRIC ACID/SODIUM CITRATE 334-500MG
30 SOLUTION, ORAL ORAL EVERY 6 HOURS PRN
Status: DISCONTINUED | OUTPATIENT
Start: 2021-01-19 | End: 2021-01-19 | Stop reason: HOSPADM

## 2021-01-19 RX ADMIN — FENTANYL CITRATE 100 MCG: 50 INJECTION, SOLUTION INTRAMUSCULAR; INTRAVENOUS at 17:47

## 2021-01-19 RX ADMIN — ROPIVACAINE HYDROCHLORIDE 10 ML: 2 INJECTION, SOLUTION EPIDURAL; INFILTRATION at 12:19

## 2021-01-19 RX ADMIN — OXYTOCIN 2000 ML/HR: 10 INJECTION, SOLUTION INTRAMUSCULAR; INTRAVENOUS at 20:20

## 2021-01-19 RX ADMIN — ROPIVACAINE HYDROCHLORIDE 200 MG: 2 INJECTION, SOLUTION EPIDURAL; INFILTRATION at 12:21

## 2021-01-19 RX ADMIN — LIDOCAINE HYDROCHLORIDE: 10 INJECTION, SOLUTION INFILTRATION; PERINEURAL at 20:20

## 2021-01-19 RX ADMIN — BUPIVACAINE HYDROCHLORIDE 8 ML: 2.5 INJECTION, SOLUTION EPIDURAL; INFILTRATION; INTRACAUDAL; PERINEURAL at 17:47

## 2021-01-19 RX ADMIN — SODIUM CHLORIDE, POTASSIUM CHLORIDE, SODIUM LACTATE AND CALCIUM CHLORIDE: 600; 310; 30; 20 INJECTION, SOLUTION INTRAVENOUS at 08:10

## 2021-01-19 RX ADMIN — MISOPROSTOL 25 MCG: 100 TABLET ORAL at 01:04

## 2021-01-19 RX ADMIN — OXYTOCIN 125 ML/HR: 10 INJECTION, SOLUTION INTRAMUSCULAR; INTRAVENOUS at 21:41

## 2021-01-19 RX ADMIN — IBUPROFEN 600 MG: 600 TABLET, FILM COATED ORAL at 22:52

## 2021-01-19 RX ADMIN — ROPIVACAINE HYDROCHLORIDE 200 MG: 2 INJECTION, SOLUTION EPIDURAL; INFILTRATION; PERINEURAL at 12:21

## 2021-01-19 RX ADMIN — LIDOCAINE HYDROCHLORIDE,EPINEPHRINE BITARTRATE 5 ML: 15; .005 INJECTION, SOLUTION EPIDURAL; INFILTRATION; INTRACAUDAL; PERINEURAL at 12:16

## 2021-01-19 RX ADMIN — SODIUM CHLORIDE, POTASSIUM CHLORIDE, SODIUM LACTATE AND CALCIUM CHLORIDE: 600; 310; 30; 20 INJECTION, SOLUTION INTRAVENOUS at 06:31

## 2021-01-19 RX ADMIN — OXYTOCIN 1 MILLI-UNITS/MIN: 10 INJECTION, SOLUTION INTRAMUSCULAR; INTRAVENOUS at 06:31

## 2021-01-19 ASSESSMENT — PAIN DESCRIPTION - PAIN TYPE
TYPE: ACUTE PAIN
TYPE: ACUTE PAIN

## 2021-01-19 ASSESSMENT — LIFESTYLE VARIABLES
AVERAGE NUMBER OF DAYS PER WEEK YOU HAVE A DRINK CONTAINING ALCOHOL: 0
CONSUMPTION TOTAL: NEGATIVE
EVER_SMOKED: NEVER
TOTAL SCORE: 0
TOTAL SCORE: 0
HOW MANY TIMES IN THE PAST YEAR HAVE YOU HAD 5 OR MORE DRINKS IN A DAY: 0
ON A TYPICAL DAY WHEN YOU DRINK ALCOHOL HOW MANY DRINKS DO YOU HAVE: 0
HAVE YOU EVER FELT YOU SHOULD CUT DOWN ON YOUR DRINKING: NO
EVER HAD A DRINK FIRST THING IN THE MORNING TO STEADY YOUR NERVES TO GET RID OF A HANGOVER: NO
TOTAL SCORE: 0
ALCOHOL_USE: NO
HAVE PEOPLE ANNOYED YOU BY CRITICIZING YOUR DRINKING: NO
DOES PATIENT WANT TO STOP DRINKING: CANNOT ASSESS
EVER FELT BAD OR GUILTY ABOUT YOUR DRINKING: NO

## 2021-01-19 ASSESSMENT — PATIENT HEALTH QUESTIONNAIRE - PHQ9
SUM OF ALL RESPONSES TO PHQ9 QUESTIONS 1 AND 2: 0
1. LITTLE INTEREST OR PLEASURE IN DOING THINGS: NOT AT ALL
2. FEELING DOWN, DEPRESSED, IRRITABLE, OR HOPELESS: NOT AT ALL

## 2021-01-19 ASSESSMENT — PAIN SCALES - GENERAL: PAIN_LEVEL: 0

## 2021-01-19 ASSESSMENT — COPD QUESTIONNAIRES
HAVE YOU SMOKED AT LEAST 100 CIGARETTES IN YOUR ENTIRE LIFE: NO/DON'T KNOW
DURING THE PAST 4 WEEKS HOW MUCH DID YOU FEEL SHORT OF BREATH: NONE/LITTLE OF THE TIME
DO YOU EVER COUGH UP ANY MUCUS OR PHLEGM?: NO/ONLY WITH OCCASIONAL COLDS OR INFECTIONS
COPD SCREENING SCORE: 0
IN THE PAST 12 MONTHS DO YOU DO LESS THAN YOU USED TO BECAUSE OF YOUR BREATHING PROBLEMS: DISAGREE/UNSURE

## 2021-01-19 NOTE — ANESTHESIA PROCEDURE NOTES
Epidural Block    Date/Time: 1/19/2021 12:11 PM  Performed by: Christiano Olson M.D.  Authorized by: Christiano Olson M.D.     Patient Location:  OB  Start Time:  1/19/2021 12:11 PM  End Time:  1/19/2021 12:16 PM  Reason for Block: labor analgesia    patient identified, IV checked, site marked, risks and benefits discussed, surgical consent, monitors and equipment checked, pre-op evaluation and timeout performed    Patient Position:  Sitting  Prep: ChloraPrep, patient draped and sterile technique    Monitoring:  Blood pressure, continuous pulse oximetry and heart rate  Approach:  Midline  Location:  L3-L4  Injection Technique:  LARISA saline  Skin infiltration:  Lidocaine  Strength:  1%  Dose:  2ml  Needle Type:  Tuohy  Needle Gauge:  17 G  Needle Length:  3.5 in  Loss of resistance::  4  Catheter Size:  19 G  Catheter at Skin Depth:  9  Test Dose Result:  Negative

## 2021-01-19 NOTE — H&P
Obstetrics and Gynecology  Labor and Delivery History and Physical    Date of Admission: 2021      ID: 30 y.o.  with IUP at 38w0d     Primary OB: New Frost M.D.    Attending OB: New Frost M.D.    CC: induction for difficult to control A2-GDM    HPI: Марина Zhang is a 30 y.o.  at 38w0d by 8 week ultrasound, who presents with difficult to control A2-GDM.  She is recommended to go forward with IOL at 38wk given the difficulty to control A2-GDM, as she has had accelerating insulin requirements with final dose of 34 units glargine qhs and 8 units lispro with dinner.  She has had normal  testing, with last growth on 2021 at 44%.  The patient was feeling well at her last  visit.      ROS: 10 systems reviewed and negative except as noted above.    Obstetric History    - 3/4/2020, SAB  G2 - Current, as noted in HPI      Past Medical History  Surgical History   Hx of abnormal pap smear  Hx of chlamydia Negative      Gynecologic History  Social History   Regular menses prior to pregnancy  + Hx of abnormal pap smears, most recent 3/2020 was wnl, no treatments on cervix.  + Hx of STIs: chlamydia, negative STI screens in this pregnancy Tobacco: denies  EtOH: denies  Street Drugs: denies      Medications  Allergies   No current facility-administered medications on file prior to encounter.      Current Outpatient Medications on File Prior to Encounter   Medication Sig Dispense Refill   • ondansetron (ZOFRAN ODT) 4 MG TABLET DISPERSIBLE Take 1 Tab by mouth every 6 hours as needed. 5 Tab 0    No Known Allergies     Family History   Non-contributory       O: There were no vitals taken for this visit.      Gen: NAD, AAO  Resp: unlabored  Abd: Gravid, NTTP,Cephalic by [place], No rebound or guarding  Ext: NTTP, no edema, 2+DPP  Pelvic: SVE 60/-2    FHT:  135bpm by handheld doppler    Prenatal labs:   Lab  Result    Rh  positive    Antibody screen  negative     Rubella  immune    HIV  Non-reactive    RPR  Non-reactive    HBsAg  negative    Varicella  NONIMMUNE    Gonorrhea/chlamydia/trich  neg/neg/neg    Aneuploidy screening  AFP Tetra negative    1h Glucose  254 abn    GBS  negative       A/P: Марина Zhang is a  at 38w0d by 8wk U/S who presents for induction of labor for difficult to control A2-GDM.   *Admit to L&D  *IV, CBC, T&S on hold  *Induction of Labor: Given unfavorable cervix in the office.  Plan cervical ripening with misoprostol.  Oxytocin/AROM thereafter.     - Misoprosotol 25mcg q4h PRN Hall score < 8 up to 3 doses.  *A2-GDM: difficult to control GDM, up to 34units glargine qhs and 8units at dinner.  Recommended delivery at 38wk per Dr. Crow.     - Check FSBG q4h in early labor and q2h in active labor  *FWB:  Last growth 44%ile (6lb 5oz) on 2021    - CEFM.  *Pain: fentanyl and epidural as appropriate per stage of labor  *Global: Rh+, RubImm, GBS neg.    - Guillermo Frost MD, MS,  2021, 8:36 PM

## 2021-01-19 NOTE — CARE PLAN
Problem: Safety  Goal: Will remain free from injury  Outcome: PROGRESSING AS EXPECTED  Note: Safety education provided. Patient encouraged to utilize call light, bed in lowest and locked position and walkways kept clutter free.     Problem: Knowledge Deficit  Goal: Knowledge of disease process/condition, treatment plan, diagnostic tests, and medications will improve  Outcome: PROGRESSING AS EXPECTED  Note: POC discussed with patient and FOB. Questions answered and verbalizes understanding.

## 2021-01-19 NOTE — CARE PLAN
Problem: Fluid Volume:  Goal: Will maintain balanced intake and output  Outcome: PROGRESSING AS EXPECTED     Problem: Communication  Goal: The ability to communicate needs accurately and effectively will improve  Outcome: PROGRESSING AS EXPECTED     Problem: Safety  Goal: Will remain free from injury  Outcome: PROGRESSING AS EXPECTED  Goal: Will remain free from falls  Outcome: PROGRESSING AS EXPECTED     Problem: Infection  Goal: Will remain free from infection  Outcome: PROGRESSING AS EXPECTED     Problem: Venous Thromboembolism (VTW)/Deep Vein Thrombosis (DVT) Prevention:  Goal: Patient will participate in Venous Thrombosis (VTE)/Deep Vein Thrombosis (DVT)Prevention Measures  Outcome: PROGRESSING AS EXPECTED     Problem: Bowel/Gastric:  Goal: Normal bowel function is maintained or improved  Outcome: PROGRESSING AS EXPECTED  Goal: Will not experience complications related to bowel motility  Outcome: PROGRESSING AS EXPECTED     Problem: Knowledge Deficit  Goal: Knowledge of disease process/condition, treatment plan, diagnostic tests, and medications will improve  Outcome: PROGRESSING AS EXPECTED  Goal: Knowledge of the prescribed therapeutic regimen will improve  Outcome: PROGRESSING AS EXPECTED     Problem: Discharge Barriers/Planning  Goal: Patient's continuum of care needs will be met  Outcome: PROGRESSING AS EXPECTED

## 2021-01-19 NOTE — ANESTHESIA PREPROCEDURE EVALUATION
Relevant Problems   No relevant active problems   Term pregnancy  Labor pain    Physical Exam    Airway   Mallampati: II  TM distance: >3 FB  Neck ROM: full       Cardiovascular - normal exam  Rhythm: regular  Rate: normal  (-) murmur     Dental - normal exam           Pulmonary - normal exam  Breath sounds clear to auscultation     Abdominal    Neurological - normal exam                 Anesthesia Plan    ASA 2       Plan - epidural   Neuraxial block will be labor analgesia              Pertinent diagnostic labs and testing reviewed    Informed Consent:    Anesthetic plan and risks discussed with patient.

## 2021-01-19 NOTE — PROGRESS NOTES
"Obstetrics and Gynecology  Labor and Delivery Progress Note    ID/CC: 30 y.o. is a  at 38w1d, difficult to control A2-GDM    S: Pt doing well.  CTX starting to get uncomfortable.     O: /64   Pulse 87   Temp 35.9 °C (96.6 °F) (Temporal)   Resp 16   Ht 1.626 m (5' 4\")   Wt 66.2 kg (146 lb)   SpO2 99%   BMI 25.06 kg/m²    Gen: NAD, AAO  FHT: 130/mod daquan/+accels, -decels  [place]: q2-3min, MVU adequate  SVE: 1-2/90/-1     2021 23:38 2021 23:40 2021 03:54 2021 08:00   Glucose - Accu-Ck 97  94 81     A/P: Марина Zhang is a 30 y.o.  at 38w1d, difficult to control A2-GDM.  AVSS.  Cat I FHT.  *A2-GDM: Glucose well controlled.   - FSBG q4h in early labor, q2h in active labor   *Labor: s/p Misoprostol x1, on oxytocin.  Now, s/p AROM, IUPC placed for CTX monitoring.  Anticipate vaginal delivery.   - Recheck cx in 4h or as clinically indicated.    *FWB: Reassuring, reactive, Cat I FHT.     - CEFM.  *Pain: epidural planned soon  *Rh+, RubImm, GBS neg.   - Guillermo Frost M.D., 2021, 12:04 PM    "

## 2021-01-19 NOTE — PROGRESS NOTES
2300 - Pt arrived for scheduled induction of labor. Pt c/o mild irregular contractions, denies leaking of fluid or bleeding, +fetal movement. Pt and spouse introduced to the room and call light, plan of care discussed.     2330 - PIV placed, labs drawn as ordered.    0010 - SVE FT/60/-2, pt sergio too frequently for nurse placed cytotec. Fluid bolus started to help space out contractions.     0104 - Cyotec placed vaginal by Charge AMAURY Felton.     0352 - Pt resting appears comfortable. Pt states she is feeling contractions increasing in strength. SVE 1/75/-2. BGL rechecked 94 and VS reassessed. Pt with no needs at this time. CTX q2-3 mins, FHT cat 1.    0630 - Pt sitting on birthing ball, appears comfortable but does state contractions are strong. SVE 1-2/80/-2. Cat 1 tracing. Pt started on pitocin at 1mu/min as ordered.    0700 - Bedside report to AMAURY Pineda.

## 2021-01-19 NOTE — PROGRESS NOTES
"Obstetrics and Gynecology  Labor and Delivery Progress Note    ID/CC: 30 y.o. is a  at 38w1d, difficult to control A2-GDM    S: Pt doing well.  Feeling CTX mildly    O: /67   Pulse 85   Temp 36.2 °C (97.2 °F) (Temporal)   Resp 16   Ht 1.626 m (5' 4\")   Wt 66.2 kg (146 lb)   SpO2 95%   BMI 25.06 kg/m²    Gen: NAD, AAO  FHT: 130/mod daquan/+accels, -decels  [place]: q2-3min  SVE: deferred, last exam -/-2 per RN exam at 0630     2021 23:38 2021 23:40 2021 03:54 2021 08:00   Glucose - Accu-Ck 97  94 81     A/P: Марина Zhang is a 30 y.o.  at 38w1d, difficult to control A2-GDM.  AVSS.  Cat I FHT.  *A2-GDM: Glucose well controlled   - FSBG q4h in early labor, q2h in active labor   *Labor: s/p Misoprostol x1, now on oxytocin.  Anticipate vaginal delivery.   - Recheck cx when uncomfortable or as clinically indicated.    *FWB: Reassuring, reactive, Cat I FHT.     - CEFM.  *Pain: epidural planned when needed  *Rh+, RubImm, GBS neg.   - Guillermo Frost M.D., 2021, 8:49 AM    "

## 2021-01-19 NOTE — PROGRESS NOTES
0700 Bedside report received from ZACH Marie RN. POC reviewed with patient. Epidural information provided, patient encouraged to notify RN when ready to receive epidural.     0800 FS = 81    0845 Dr. Frost at bedside to update patient on POC.     0850 Call placed to Dr. Frost as patient is requesting something to eat. Orders received for light meal.     0924 Patient standing at bedside rocking hips, educated FOB regarding counter pressure and hip squeeze during contractions. Patient comfortable and coping well.    1055 Discussed pain plan and anesthesia schedule with patient. Patient would like to wait until pain increases and is aware that the anesthesiologist may be in a procedure this afternoon delaying epidural but alternative pain management options reviewed. Update given to Dr. Frost.    1200 FS = 88. Dr. Olson at bedside for epidural placement. Patient tolerated well.    1500 Dr. Frost at bedside to evaluate. Update given regarding RN SVE and contraction pattern. Orders to continue to titrate pitocin carefully as appropriate.    1600 FS = 71, provided patient with apple juice.     1736 Call placed to Dr. Olson. Patient becoming increasingly uncomfortable and pain increasing. Patient pushed bolus button with no relief following 15 minutes.     1746 Dr. Olson at the bedside to evaluate.    1825 SVE performed. Call placed to Dr. Frost for labor status update.    1855 FS performed. Bedside report given to ZACH Marie RN. POC reviewed.

## 2021-01-20 LAB
ERYTHROCYTE [DISTWIDTH] IN BLOOD BY AUTOMATED COUNT: 39.4 FL (ref 35.9–50)
GLUCOSE BLD-MCNC: 79 MG/DL (ref 65–99)
HCT VFR BLD AUTO: 34.7 % (ref 37–47)
HGB BLD-MCNC: 11.4 G/DL (ref 12–16)
MCH RBC QN AUTO: 26.3 PG (ref 27–33)
MCHC RBC AUTO-ENTMCNC: 32.9 G/DL (ref 33.6–35)
MCV RBC AUTO: 80 FL (ref 81.4–97.8)
PLATELET # BLD AUTO: 132 K/UL (ref 164–446)
PMV BLD AUTO: 11.8 FL (ref 9–12.9)
RBC # BLD AUTO: 4.34 M/UL (ref 4.2–5.4)
WBC # BLD AUTO: 15.2 K/UL (ref 4.8–10.8)

## 2021-01-20 PROCEDURE — 82962 GLUCOSE BLOOD TEST: CPT

## 2021-01-20 PROCEDURE — A9270 NON-COVERED ITEM OR SERVICE: HCPCS | Performed by: OBSTETRICS & GYNECOLOGY

## 2021-01-20 PROCEDURE — 700102 HCHG RX REV CODE 250 W/ 637 OVERRIDE(OP): Performed by: OBSTETRICS & GYNECOLOGY

## 2021-01-20 PROCEDURE — 36415 COLL VENOUS BLD VENIPUNCTURE: CPT

## 2021-01-20 PROCEDURE — 770002 HCHG ROOM/CARE - OB PRIVATE (112)

## 2021-01-20 PROCEDURE — 85027 COMPLETE CBC AUTOMATED: CPT

## 2021-01-20 RX ADMIN — IBUPROFEN 600 MG: 600 TABLET, FILM COATED ORAL at 13:44

## 2021-01-20 RX ADMIN — IBUPROFEN 600 MG: 600 TABLET, FILM COATED ORAL at 05:06

## 2021-01-20 RX ADMIN — IBUPROFEN 600 MG: 600 TABLET, FILM COATED ORAL at 19:52

## 2021-01-20 RX ADMIN — PRENATAL WITH FERROUS FUM AND FOLIC ACID 1 TABLET: 3080; 920; 120; 400; 22; 1.84; 3; 20; 10; 1; 12; 200; 27; 25; 2 TABLET ORAL at 09:04

## 2021-01-20 RX ADMIN — ACETAMINOPHEN 325 MG: 325 TABLET ORAL at 17:37

## 2021-01-20 ASSESSMENT — EDINBURGH POSTNATAL DEPRESSION SCALE (EPDS)
THE THOUGHT OF HARMING MYSELF HAS OCCURRED TO ME: NEVER
I HAVE BLAMED MYSELF UNNECESSARILY WHEN THINGS WENT WRONG: NOT VERY OFTEN
I HAVE FELT SAD OR MISERABLE: NOT VERY OFTEN
I HAVE BEEN ANXIOUS OR WORRIED FOR NO GOOD REASON: HARDLY EVER
I HAVE BEEN SO UNHAPPY THAT I HAVE HAD DIFFICULTY SLEEPING: YES, SOMETIMES
I HAVE LOOKED FORWARD WITH ENJOYMENT TO THINGS: RATHER LESS THAN I USED TO
I HAVE BEEN SO UNHAPPY THAT I HAVE BEEN CRYING: ONLY OCCASIONALLY
I HAVE BEEN ABLE TO LAUGH AND SEE THE FUNNY SIDE OF THINGS: NOT QUITE SO MUCH NOW
I HAVE FELT SCARED OR PANICKY FOR NO GOOD REASON: NO, NOT MUCH
THINGS HAVE BEEN GETTING ON TOP OF ME: NO, MOST OF THE TIME I HAVE COPED QUITE WELL

## 2021-01-20 ASSESSMENT — PAIN DESCRIPTION - PAIN TYPE
TYPE: ACUTE PAIN
TYPE: ACUTE PAIN

## 2021-01-20 NOTE — PROGRESS NOTES
- Bedside report received from AMAURY Parada. Care assumed at this time. Pt and spouse updated on plan of care.     - Pt called out c/o pressure. SVE 10/100/+1. MD Frost called and notified, will set pt up for pushing and start.     - Pt placed in stirrups, educated on pushing technique and started pushing.      - MD Frost called to bedside for delivery.     -  of viable baby boy, double nuchal, APGARS 8/9. Placenta delivered at , marginal cord noted.  , 1st degree perineal laceration repaired.      - Transition AMAURY Jackson called to bedside, baby with grunting respirations noted, sats WNL. CPT done. Baby improved.      - Fundal check, fundus firm to the right of the umbilicus, full bladder palpated. Pt assisted up to restroom, unable to void, pericare done and gown changed. Pt provided with ice pack, tucks and spray for vaginal swelling and bruising as well as hemorrhoids noted.      - Phlebotomy May called to get status on baby blood draw that was scheduled for . May enroute to draw.      - Straight cath performed, 800mls obtained of urine. Fundus firm at umbilicus with light bleeding.      - Pt transferred to post-partum, bedside report given to AMAURY Chatman, bands confirmed.

## 2021-01-20 NOTE — L&D DELIVERY NOTE
DATE OF SERVICE:  2021     PRIMARY AND DELIVERING OBSTETRICIAN:  New Frost MD     PROCEDURE:  Normal spontaneous vaginal delivery.     PREPROCEDURE DIAGNOSES:  1.  A 30-year-old  with intrauterine pregnancy at 38 weeks 1 day   gestational age by 8-week ultrasound.  2.  Difficult to control A2 gestational diabetes, followed with maternal fetal   medicine (Dr. Bhavik Crow).  3.  Induction of labor for the same.     POSTPROCEDURE DIAGNOSES:  1.  A 30-year-old  with intrauterine pregnancy at 38 weeks 1 day   gestational age by 8-week ultrasound.  2.  Difficult to control A2 gestational diabetes, followed with maternal fetal   medicine (Dr. Bhavik Crow).  3.  Induction of labor for the same.  4.  Postpartum status post normal spontaneous vaginal delivery.     ANESTHESIA:  Epidural.     ANESTHESIOLOGIST:  Christiano Olson MD     FINDINGS:  1.  Viable male infant in FATIMAH position, delivered at  on 2021.  2.  Weight 3050 grams (6 pounds 12 ounces).  3.  Apgars 8 at 1 minute and 9 at 5 minutes.  4.  Intact, normal-appearing placenta with 3-vessel cord and marginal cord   insertion.  5.  Clear amniotic fluid.  6.  First-degree perineal laceration.  7.  Double nuchal cord, loose.     ESTIMATED BLOOD LOSS:  200 mL.     NARRATIVE:  The patient is a 30-year-old  with intrauterine pregnancy at   38 weeks 1 day gestational age by 8-week ultrasound who presents to labor and   delivery for induction of labor for difficult to control gestational diabetes.    The patient has had accelerating need for insulin in the last few weeks.    Her last regimen was 36 units of Lantus at bedtime and 4 units of lispro with   dinner.  The patient was recommended by maternal fetal medicine to undergo   induction of labor at 38 weeks for difficult to control diabetes.  She had   normal  fetal testing.  The patient presented last evening for   induction of labor.  She received one dose of misoprostol and was  started on   oxytocin overnight.  She underwent artificial rupture of membranes this   morning at 1-2 cm and had an IUPC placed for better contraction monitoring   given her regular and frequent contractions.  Oxytocin was adjusted.  She   received an epidural for pain control.  She was 4 cm dilated by 5:00 p.m. and   proceeded quickly to complete dilation.  The patient then pushed for   approximately one hour until delivery of the fetal head retrograde   atraumatically.  It was guided out gently without traction.  A nuchal cord was   noted and reduced and a second loop was noted and reduced for a double nuchal   cord.  The anterior, then posterior shoulders delivered simply and quickly   thereafter.  The infant was immediately placed on the patient's abdomen and   warmed and dried by the awaiting baby nurse.  The nose and mouth were   suctioned with the bulb suction.  As the infant was noted to be immediately   vigorous and moving all extremities equally, we awaited cessation of cord   pulsation before the cord was doubly clamped and cut.  The placenta then   delivered simply and quickly thereafter and was noted to be intact as noted   above.  A survey of the patient's perineum, vulva and vagina revealed the   above findings.  A first-degree perineal laceration was repaired with a   running locked stitch of 0 Vicryl.  A small adjacent portion of the laceration   was then also repaired with a figure-of-eight stitch of 0 Vicryl.  At the end   of the repair, the edges are hemostatic and well re-approximated.  The   patient tolerated the procedure well.  There were no complications.  Sponge   and needle counts were correct at the end of the procedure.  The patient and   her infant remained in her breathing room briefly, later transfer to   maternity.  Dr. Frost was present throughout and performed the entire   procedure.     COMPLICATIONS:  None.     CONDITION:  Good.     DISPOSITION:  Birthing room, then  maternity.        ______________________________  MD JACINTO Wilson    DD:  01/20/2021 00:13  DT:  01/20/2021 00:56    Job#:  [id number]

## 2021-01-20 NOTE — ANESTHESIA TIME REPORT
Anesthesia Start and Stop Event Times     Date Time Event    1/19/2021 1209 Ready for Procedure     1211 Anesthesia Start     2012 Anesthesia Stop        Responsible Staff  01/19/21    Name Role Begin End    Christiano Olson M.D. Anesth 1211 2012        Preop Diagnosis (Free Text):  Pre-op Diagnosis             Preop Diagnosis (Codes):    Post op Diagnosis  Pregnancy  Labor pain    Premium Reason  A. 3PM - 7AM    Comments:

## 2021-01-20 NOTE — PROGRESS NOTES
Assumed care. Assessment completed, VSS. Fundus firm, lochia light. Pt. ambulating and voiding. Pt. requests pain medication as scheduled.POC discussed, all questions answered. Encouraged to call with needs.

## 2021-01-20 NOTE — CARE PLAN
Problem: Altered physiologic condition related to immediate post-delivery state and potential for bleeding/hemorrhage  Goal: Patient physiologically stable as evidenced by normal lochia, palpable uterine involution and vital signs within normal limits  Outcome: PROGRESSING AS EXPECTED  Note: Fundus firm and lochia light, perineum edematous. Tucks, spray, and ice in use.      Problem: Pain Management  Goal: Pain level will decrease to patient's comfort goal  Outcome: PROGRESSING AS EXPECTED  Note: Pt requests pain meds as scheduled.

## 2021-01-20 NOTE — CARE PLAN
Problem: Safety  Goal: Will remain free from falls  Outcome: PROGRESSING AS EXPECTED  Note: Patient ambulating without additional assistance. Patient wearing non-slip socks and room is cluttered free. Call light and personal items all within reach. Patient reminded on call light if assistance is needed. Will continue to monitor for safety.       Problem: Alteration in comfort related to episiotomy, vaginal repair and/or after birth pains  Goal: Patient is able to ambulate, care for self and infant  Outcome: PROGRESSING AS EXPECTED  Note: Patient is able to get up and move around. Patient demonstrates ability to provide care for herself and for infant. Patient feeding at scheduled times and assisting with infant care. Patient is progressing as expected.

## 2021-01-20 NOTE — PROGRESS NOTES
2250-Patient arrived in wheelchair accompanied by FOB . Report received and bands verified with AMAURY Bray. Patient and FOB oriented to unit, room, call light, emergency pull cord, safe sleeping policy, feeding frequency and plan of care. Fundus firm, lochia light, perineum edema noted. Perineal ice pack given. Will continue to monitor patients pain and bleeding per protocol.    0200-Patient assisted to restroom. Patient able to void. New pad, tucks, and underwear given. Patient denies any difficulty voiding. Patient escorted back to bed. Patient denies any additional needs at this time. Fundus firm at umbilicus, light lochia. Doctor Santosh informed about [facility] temperature of 100.5 (F). Orders to see if any other symptoms present. No additional orders at this time.     0620-Fasting glucose check performed. (79).

## 2021-01-20 NOTE — LACTATION NOTE
This note was copied from a baby's chart.  Baby 38.1 weeks, , baby 14 hours old, MOB Hx GDM- insulin. Baby STS on mother's chest, asleep. MOB watched The [employer] hand expression video, LC provided demo. After 3 minutes of hand expression on both breasts, expressed 1 pin point drop of colostrum/each breast. Discussed with mother GDM can be a risk factor for delayed BM. Educated mother to breastfeed & hand express with each feed or every 2 hours. Baby's diaper changed, baby awake attempted to latch, baby tongue sucking then became sleepy- no latch. Discussed if baby struggling to latch today, may need to start pumping tonight at 24 hours of baby's age, mother voiced understanding.     Teaching on hunger cues, breastfeeding when baby shows cues, a minimum 8 breastfeeds in 24 hours, no longer than 4 hours from last feed, importance of STS, positioning baby at breast nipple to nose & cluster feeding as normal.     MOB has [employer], information sheet on [facility] & Zoom given.     Breastfeeding plan:  Breastfeed on cue a minimum 8x/24 hours no longer than 4 hours from last feed.

## 2021-01-20 NOTE — PROGRESS NOTES
"Obstetrics and Gynecology  Labor and Delivery Progress Note    ID/CC: 30 y.o. is a  at 38w1d, difficult to control A2-GDM    S: Was comfortable with epidural, but now getting more uncomfortable with CTX, has not yet tried PCEA     O: /59   Pulse 90   Temp 36.3 °C (97.4 °F) (Temporal)   Resp 16   Ht 1.626 m (5' 4\")   Wt 66.2 kg (146 lb)   SpO2 99%   BMI 25.06 kg/m²    Gen: NAD, AAO  FHT: 130/mod daquan/+accels, -decels  [place]: q2-3min, MVU adequate  SVE: 4/80/-1 per RN exam     2021 03:54 2021 08:00 2021 12:00 2021 16:04   Glucose - Accu-Ck 94 81 88 71     A/P: Марина Zhang is a 30 y.o.  at 38w1d, difficult to control A2-GDM.  AVSS.  Cat I FHT.  *A2-GDM: Glucose well controlled during labor process.   - FSBG q2h   *Labor: s/p Misoprostol x1, on oxytocin, s/p AROM, IUPC placed for CTX monitoring. Making cervical change.  Anticipate vaginal delivery.   - Recheck cx in 4h or as clinically indicated.    *FWB: Reassuring, reactive, Cat I FHT.     - CEFM.  *Pain: epidural in place, will try PCEA and bolus as needed  *Rh+, RubImm, GBS neg.   - Guillermo Frost M.D., 2021, 5:40 PM      "

## 2021-01-20 NOTE — ANESTHESIA POSTPROCEDURE EVALUATION
Patient: Марина Zhang    Procedure Summary     Date: 01/19/21 Room / Location:     Anesthesia Start: 1211 Anesthesia Stop: 2012    Procedure: Labor Epidural Diagnosis:     Scheduled Providers:  Responsible Provider: Christiano Olson M.D.    Anesthesia Type: epidural ASA Status: 2          Final Anesthesia Type: epidural  Last vitals  BP   Blood Pressure: 110/76    Temp   (!) 38.1 °C (100.5 °F)    Pulse   Pulse: 87   Resp   18    SpO2   96 %      Anesthesia Post Evaluation    Patient location during evaluation: PACU  Patient participation: complete - patient participated  Level of consciousness: awake and alert  Pain score: 0    Airway patency: patent  Anesthetic complications: no  Cardiovascular status: hemodynamically stable  Respiratory status: acceptable  Hydration status: euvolemic    PONV: none

## 2021-01-20 NOTE — DISCHARGE PLANNING
Discharge Planning Assessment Post Partum    Reason for Referral: MOB scored a 10 on the EPDS screen  Address: [address] [place] [address] [address] [place] [address]  Phone: [phone]  Type of Living Situation: living with FOB  Mom Diagnosis: Pregnancy  Baby Diagnosis: -38 weeks  Primary Language: Parents speak English    Name of Baby: Mateo Gonzalez (: 21)  Father of the Baby: Antonio Gonzalez  Involved in baby’s care? Yes  Contact Information: [phone]    Prenatal Care: Yes  PCP for new baby: Pediatrician list provided to parents    Support System: FOB  Coping/Bonding between mother & baby: Yes  Source of Feeding: breast feeding  Supplies for Infant: prepared for infant    Mom's Insurance: [employer]  Baby Covered on Insurance:Yes  Mother Employed/School: Student  Other children in the home/names & ages: No, first baby    Financial Hardship/Income: denies   Mom's Mental status: alert and oriented  Services used prior to admit: plans to apply for Medicaid and has applied for [facility]    CPS History: No  Psychiatric History: No, discussed post partum with MOB and provided her with counseling resources specializing in maternal mental health  Domestic Violence History: No  Drug/ETOH History: No    Resources Provided: pediatrician list, children and family resource list, and post partum support and counseling resources provided  Referrals Made: diaper bank referral provided     Clearance for Discharge: Infant is cleared to discharge home with parents

## 2021-01-20 NOTE — PROGRESS NOTES
"Obstetrics and Gynecology  Postpartum Progress Note    CC: PPD1 s/p , A2-GDM    S: Pt feeling well.  Pain well controlled.  Gretchen reg diet.  Has ambulated.  Lochia mod. Voiding w/o difficulty.  +flatus/-BM.  \"Trying\" to breastfeed.  Pt denies CP/SOB, N/V, constipation/diarrhea, lower leg pain.      O:   Vitals:    21 2321 21 0000 21 0157 21 0509   BP: 110/76  108/69 118/75   Pulse: 87  82 86   Resp:    Temp: (!) 38.1 °C (100.5 °F) 36.7 °C (98.1 °F) 37 °C (98.6 °F) 36.7 °C (98.1 °F)   TempSrc: Temporal Temporal Temporal Temporal   SpO2: 96%  96% 98%   Weight:       Height:          Temp (24hrs), Av.5 °C (97.7 °F), Min:35.9 °C (96.6 °F), Max:38.1 °C (100.5 °F)       Gen: NAD, AAO    CV: RRR    Pulm: unlabored    Abd: Soft, NT, no rebound/guarding, fundus firm at U.    Ext: WWP, trace edema, non-tender to palpation    Recent Labs     21  2340 21  0440   WBC 11.1* 15.2*   RBC 4.52 4.34   HEMOGLOBIN 12.2 11.4*   HEMATOCRIT 36.5* 34.7*   MCV 80.8* 80.0*   MCH 27.0 26.3*   RDW 39.1 39.4   PLATELETCT 157* 132*   MPV 11.7 11.8   NEUTSPOLYS 62.60  --    LYMPHOCYTES 23.40  --    MONOCYTES 7.10  --    EOSINOPHILS 5.70  --    BASOPHILS 0.40  --       Fasting FSB    A/P: Марина Zhang is a 30 y.o.  postpartum s/p , A2-GDM with normal fasting glucose this am.  AVSS.  Recovering well.    - Continue routine postpartum care.    - Encourage ambulation.    - Continue current pain regimen    - Have stopped insulin    - Will plan 2h glucose tolerance test at [facility]    Anticipate d/c tomorrow    New Frost MD, MS,  2021, 6:35 AM     "

## 2021-01-21 VITALS
HEIGHT: 64 IN | DIASTOLIC BLOOD PRESSURE: 69 MMHG | BODY MASS INDEX: 24.92 KG/M2 | RESPIRATION RATE: 16 BRPM | OXYGEN SATURATION: 97 % | SYSTOLIC BLOOD PRESSURE: 118 MMHG | WEIGHT: 146 LBS | HEART RATE: 80 BPM | TEMPERATURE: 98 F

## 2021-01-21 LAB — GLUCOSE BLD-MCNC: 99 MG/DL (ref 65–99)

## 2021-01-21 PROCEDURE — 700102 HCHG RX REV CODE 250 W/ 637 OVERRIDE(OP): Performed by: OBSTETRICS & GYNECOLOGY

## 2021-01-21 PROCEDURE — 82962 GLUCOSE BLOOD TEST: CPT

## 2021-01-21 PROCEDURE — A9270 NON-COVERED ITEM OR SERVICE: HCPCS | Performed by: OBSTETRICS & GYNECOLOGY

## 2021-01-21 RX ORDER — PSEUDOEPHEDRINE HCL 30 MG
100 TABLET ORAL 2 TIMES DAILY PRN
Qty: 40 CAP | Refills: 1 | Status: SHIPPED | OUTPATIENT
Start: 2021-01-21

## 2021-01-21 RX ORDER — IBUPROFEN 800 MG/1
800 TABLET ORAL EVERY 8 HOURS PRN
Qty: 40 TAB | Refills: 1 | Status: SHIPPED | OUTPATIENT
Start: 2021-01-21

## 2021-01-21 RX ADMIN — IBUPROFEN 600 MG: 600 TABLET, FILM COATED ORAL at 05:53

## 2021-01-21 RX ADMIN — PRENATAL WITH FERROUS FUM AND FOLIC ACID 1 TABLET: 3080; 920; 120; 400; 22; 1.84; 3; 20; 10; 1; 12; 200; 27; 25; 2 TABLET ORAL at 08:49

## 2021-01-21 NOTE — PROGRESS NOTES
1950 Pt doing well bonding with baby, Assessment done complained of mild abdominal pain medicated her as per doctor orders, Encourage to call for assistance, Needs attended.

## 2021-01-21 NOTE — LACTATION NOTE
"Parents preparing for discharge. State they are feeding donor milk donated by a friend since \"mom doesn't have enough milk\".  Reviewed normal  tummy size and importance of baby going to the breast to establish a mature milk supply.    Baby showing hunger cues. Mom encouraged to put baby to breast. Baby latched eagerly with audible swallows. Mom denied discomfort and latched baby on her own.    Parents have the number for [facility] and state they will f/u if needed.  "

## 2021-01-21 NOTE — PROGRESS NOTES
Assessment complete. Discussed POC and answered all questions.     1500- Infant placed in car seat by parents. Checked by RN. Infant and patient discharged in stable condition via wheel chair. Escorted by CNA to [facility].

## 2021-01-21 NOTE — DISCHARGE SUMMARY
"Discharge Summary    Admission Date: 2021    Discharge Date: 2021    Diagnosis:  IUP at 38+ weeks  A2 GDM  GBS neg    Procedures:      Subjective: Pt doing well. Pain controlled. Normal lochia. Eating, voiding and ambulating without difficulty. Baby doing well. Breast feeding. Desires discharge home today.    /69   Pulse 80   Temp 36.7 °C (98 °F) (Temporal)   Resp 16   Ht 1.626 m (5' 4\")   Wt 66.2 kg (146 lb)   SpO2 97%   Breastfeeding Yes   BMI 25.06 kg/m²     GEN: NAD, comfortable  GI: soft, NT, ND  Gu: fundus firm, nontender, and below umbilicus  EXT: nontender, no edema    Hospital Course: The patient was admitted to L&D for induction of labor at 38 weeks due to A2 GDM with difficulty controlling blood sugars. She received misoprostol and pitocin. AROM was performed and epidural placed for pain control. She then progressed quickly to complete dilation. She delivered a healthy baby boy without complications, weight 6lb 12oz, Apgars 8 and 9. Her postpartum course was uncomplicated. She was discharged home on PPD#2.    Discharge Instructions   1. Diet : general  2. Activity: Pelvic rest for 6 weeks     Марина Zhang   Home Medication Instructions GIAN:[id number]    Printed on:21 0715   Medication Information                      docusate sodium 100 MG Cap  Take 100 mg by mouth 2 times a day as needed for Constipation.             ibuprofen (MOTRIN) 800 MG Tab  Take 1 Tab by mouth every 8 hours as needed (For cramping after delivery; do not give if patient is receiving ketorolac (Toradol)).                  Follow up: 6 weeks postpartum with Dr. Frost    Complications: none      Brittani Morris M.D.    "